# Patient Record
Sex: FEMALE | Race: OTHER
[De-identification: names, ages, dates, MRNs, and addresses within clinical notes are randomized per-mention and may not be internally consistent; named-entity substitution may affect disease eponyms.]

---

## 2019-03-20 ENCOUNTER — HOSPITAL ENCOUNTER (INPATIENT)
Dept: HOSPITAL 54 - ER | Age: 79
LOS: 3 days | Discharge: HOME | DRG: 871 | End: 2019-03-23
Attending: INTERNAL MEDICINE | Admitting: NURSE PRACTITIONER
Payer: MEDICARE

## 2019-03-20 VITALS — WEIGHT: 188 LBS | HEIGHT: 60 IN | BODY MASS INDEX: 36.91 KG/M2

## 2019-03-20 DIAGNOSIS — I50.33: ICD-10-CM

## 2019-03-20 DIAGNOSIS — I13.0: ICD-10-CM

## 2019-03-20 DIAGNOSIS — J45.909: ICD-10-CM

## 2019-03-20 DIAGNOSIS — J81.1: ICD-10-CM

## 2019-03-20 DIAGNOSIS — A41.9: Primary | ICD-10-CM

## 2019-03-20 DIAGNOSIS — E66.9: ICD-10-CM

## 2019-03-20 DIAGNOSIS — N17.9: ICD-10-CM

## 2019-03-20 DIAGNOSIS — J18.9: ICD-10-CM

## 2019-03-20 DIAGNOSIS — J96.01: ICD-10-CM

## 2019-03-20 DIAGNOSIS — N18.9: ICD-10-CM

## 2019-03-20 DIAGNOSIS — I16.9: ICD-10-CM

## 2019-03-20 DIAGNOSIS — N39.0: ICD-10-CM

## 2019-03-20 LAB
ALBUMIN SERPL BCP-MCNC: 3.9 G/DL (ref 3.4–5)
ALP SERPL-CCNC: 109 U/L (ref 46–116)
ALT SERPL W P-5'-P-CCNC: 35 U/L (ref 12–78)
AST SERPL W P-5'-P-CCNC: 40 U/L (ref 15–37)
BASOPHILS # BLD AUTO: 0.1 /CMM (ref 0–0.2)
BASOPHILS NFR BLD AUTO: 0.7 % (ref 0–2)
BILIRUB DIRECT SERPL-MCNC: 0.1 MG/DL (ref 0–0.2)
BILIRUB SERPL-MCNC: 0.5 MG/DL (ref 0.2–1)
BUN SERPL-MCNC: 32 MG/DL (ref 7–18)
CALCIUM SERPL-MCNC: 8.6 MG/DL (ref 8.5–10.1)
CHLORIDE SERPL-SCNC: 104 MMOL/L (ref 98–107)
CO2 SERPL-SCNC: 26 MMOL/L (ref 21–32)
CREAT SERPL-MCNC: 1.6 MG/DL (ref 0.6–1.3)
EOSINOPHIL NFR BLD AUTO: 1 % (ref 0–6)
GLUCOSE SERPL-MCNC: 174 MG/DL (ref 74–106)
HCT VFR BLD AUTO: 37 % (ref 33–45)
HGB BLD-MCNC: 11.8 G/DL (ref 11.5–14.8)
LYMPHOCYTES NFR BLD AUTO: 0.4 /CMM (ref 0.8–4.8)
LYMPHOCYTES NFR BLD AUTO: 2.6 % (ref 20–44)
MCHC RBC AUTO-ENTMCNC: 32 G/DL (ref 31–36)
MCV RBC AUTO: 83 FL (ref 82–100)
MONOCYTES NFR BLD AUTO: 0.2 /CMM (ref 0.1–1.3)
MONOCYTES NFR BLD AUTO: 1.2 % (ref 2–12)
NEUTROPHILS # BLD AUTO: 12.8 /CMM (ref 1.8–8.9)
NEUTROPHILS NFR BLD AUTO: 94.5 % (ref 43–81)
NT-PROBNP SERPL-MCNC: 1032 PG/ML (ref 0–125)
PLATELET # BLD AUTO: 162 /CMM (ref 150–450)
POTASSIUM SERPL-SCNC: 4.5 MMOL/L (ref 3.5–5.1)
PROT SERPL-MCNC: 7.1 G/DL (ref 6.4–8.2)
RBC # BLD AUTO: 4.49 MIL/UL (ref 4–5.2)
SODIUM SERPL-SCNC: 140 MMOL/L (ref 136–145)
WBC NRBC COR # BLD AUTO: 13.6 K/UL (ref 4.3–11)

## 2019-03-20 PROCEDURE — G0378 HOSPITAL OBSERVATION PER HR: HCPCS

## 2019-03-20 NOTE — NUR
PT BIBRA COMPLAINING OF SOB. PER EMS, PT SATURATING 89% IN ROOM AIR, WHEN PUT 
ON NON-REBREATHER PT SATING 99%. DYSPNEA AND TACHYPNEA NOTED. PT PUT ON THE 
CARDIAC MONITOR AND PULSE OX. PT SATURATING 97% ON ROOM AIR. 2LPM O2 APPLIED 
VIA N/C.

## 2019-03-21 VITALS — SYSTOLIC BLOOD PRESSURE: 117 MMHG | DIASTOLIC BLOOD PRESSURE: 49 MMHG

## 2019-03-21 VITALS — DIASTOLIC BLOOD PRESSURE: 54 MMHG | SYSTOLIC BLOOD PRESSURE: 124 MMHG

## 2019-03-21 VITALS — SYSTOLIC BLOOD PRESSURE: 134 MMHG | DIASTOLIC BLOOD PRESSURE: 53 MMHG

## 2019-03-21 VITALS — DIASTOLIC BLOOD PRESSURE: 58 MMHG | SYSTOLIC BLOOD PRESSURE: 126 MMHG

## 2019-03-21 VITALS — DIASTOLIC BLOOD PRESSURE: 64 MMHG | SYSTOLIC BLOOD PRESSURE: 103 MMHG

## 2019-03-21 VITALS — DIASTOLIC BLOOD PRESSURE: 54 MMHG | SYSTOLIC BLOOD PRESSURE: 105 MMHG

## 2019-03-21 VITALS — DIASTOLIC BLOOD PRESSURE: 42 MMHG | SYSTOLIC BLOOD PRESSURE: 96 MMHG

## 2019-03-21 LAB
APPEARANCE UR: CLEAR
BILIRUB UR QL STRIP: NEGATIVE
COLOR UR: (no result)
GLUCOSE UR STRIP-MCNC: NEGATIVE MG/DL
HGB UR QL STRIP: NEGATIVE ERY/UL
KETONES UR STRIP-MCNC: NEGATIVE MG/DL
LEUKOCYTE ESTERASE UR QL STRIP: (no result)
NITRITE UR QL STRIP: NEGATIVE
PH UR STRIP: 5 [PH] (ref 5–8)
PROT UR QL STRIP: NEGATIVE MG/DL
RBC #/AREA URNS HPF: (no result) /HPF (ref 0–2)
UROBILINOGEN UR STRIP-MCNC: 0.2 EU/DL
WBC #/AREA URNS HPF: (no result) /HPF (ref 0–3)

## 2019-03-21 RX ADMIN — FUROSEMIDE SCH MG: 10 INJECTION INTRAVENOUS at 09:21

## 2019-03-21 RX ADMIN — FUROSEMIDE SCH MG: 10 INJECTION INTRAVENOUS at 17:16

## 2019-03-21 RX ADMIN — ASPIRIN 81 MG SCH MG: 81 TABLET ORAL at 09:21

## 2019-03-21 RX ADMIN — ATORVASTATIN CALCIUM SCH MG: 10 TABLET, FILM COATED ORAL at 22:11

## 2019-03-21 RX ADMIN — ENOXAPARIN SODIUM SCH MG: 30 INJECTION SUBCUTANEOUS at 02:58

## 2019-03-21 RX ADMIN — Medication SCH EACH: at 06:04

## 2019-03-21 RX ADMIN — DEXTROSE MONOHYDRATE SCH MLS/HR: 50 INJECTION, SOLUTION INTRAVENOUS at 02:56

## 2019-03-21 RX ADMIN — ALBUTEROL SULFATE SCH MG: 2.5 SOLUTION RESPIRATORY (INHALATION) at 02:22

## 2019-03-21 RX ADMIN — Medication SCH EACH: at 22:11

## 2019-03-21 RX ADMIN — Medication SCH EACH: at 17:16

## 2019-03-21 RX ADMIN — ALBUTEROL SULFATE SCH MG: 2.5 SOLUTION RESPIRATORY (INHALATION) at 14:43

## 2019-03-21 RX ADMIN — ALBUTEROL SULFATE SCH MG: 2.5 SOLUTION RESPIRATORY (INHALATION) at 08:26

## 2019-03-21 RX ADMIN — ALBUTEROL SULFATE SCH MG: 2.5 SOLUTION RESPIRATORY (INHALATION) at 20:18

## 2019-03-21 RX ADMIN — AZITHROMYCIN DIHYDRATE SCH MG: 250 TABLET, FILM COATED ORAL at 03:33

## 2019-03-21 RX ADMIN — ALBUTEROL SULFATE SCH MG: 2.5 SOLUTION RESPIRATORY (INHALATION) at 11:20

## 2019-03-21 RX ADMIN — ENOXAPARIN SODIUM SCH MG: 30 INJECTION SUBCUTANEOUS at 22:13

## 2019-03-21 RX ADMIN — Medication SCH EACH: at 12:18

## 2019-03-21 NOTE — NUR
MS RN

RECEIVED ON BED, AWAKE,ALERT, ORIENTED X3, Estonian SPEAKING, NOT IN ANY FORM OF DISTRESS, 
RESPIRATIONS EVEN AND UNLABORED, NO SOB NOTED. LUNGS ARE RONCHI BILATERALLY, ABDOMEN 
SOFT,POSITIVE BOWEL SOUNDS. DENIES PAIN AT THIS TIME,ALL NEEDS ATTENDED.

## 2019-03-21 NOTE — NUR
TELE/RN OPENING NOTES

NEW ADMITTED PATIENT ARRIVED FROM ER ON A GURNEY ACCOMPANIED BY DAUGHTER, PATIENT IS A 79 
Y.O Hungarian SPEAKING ABLE TO AMBULATE WITH ASSISTANCE, MORBID OBESE, REQUIRING NASAL CANULA 
OF 3LITER, WHEEZING, MD MAY IS THE ADMITTING MD, SKIN INTACT, IV ON LEFT AC GAUGE 20 
PATENT. PER DAUGHTER HOME MEDICATION TO F/U WITH PHARM[facility]Y [facility] [phone] AND 
PCP IS DR ALPHONSE LADD. REFUSES FLU VACCINE AND PNA VACCINE. DENIES PAIN. DIET ORDER 
PER MD REGULAR. WILL MONITOR. MD WITH ORDER, IV ANTIBIOTIC THERAPY AND LOVENOX 
PRESCRIBED.ROOM ORIENTATION PROVIDED, BELONGINGS CHECKED.CALL LIGHTS WITHIN REACH, BED 
LOCKED.

## 2019-03-21 NOTE — NUR
MS RN NOTES



RECEIVED PT IN BED AND AWAKE WITH FAMILY AT BEDSIDE.  PT A/O X3 AND Bahamian SPEAKING.  
RESPIRATIONS EVEN AND UNLABORED WITH NO S/S OF ACUTE DISTRESS OR SOB NOTED.  PT DENIES PAIN 
AT THIS TIME.  CALL LIGHT WITHIN REACH.  WILL CONTINUE TO MONITOR.

## 2019-03-21 NOTE — NUR
324-2 TELE/RN NOTES

PATIENT RESTING COMFORTABLY , BREATHING TREATMENT PROVIDED BY RT, ON OXYGEN VIA NC, 
ADMINISTERED IV ANTIBIOTIV BY MOUTH AND IV, TOLERATED W/ NO S/S OF COMPLICATION, KEPT 
COMFORTABLE. WILL ENDORSE TO AM RN FOR GARY.

## 2019-03-22 VITALS — DIASTOLIC BLOOD PRESSURE: 53 MMHG | SYSTOLIC BLOOD PRESSURE: 113 MMHG

## 2019-03-22 VITALS — SYSTOLIC BLOOD PRESSURE: 134 MMHG | DIASTOLIC BLOOD PRESSURE: 58 MMHG

## 2019-03-22 VITALS — DIASTOLIC BLOOD PRESSURE: 77 MMHG | SYSTOLIC BLOOD PRESSURE: 109 MMHG

## 2019-03-22 VITALS — DIASTOLIC BLOOD PRESSURE: 49 MMHG | SYSTOLIC BLOOD PRESSURE: 104 MMHG

## 2019-03-22 VITALS — DIASTOLIC BLOOD PRESSURE: 51 MMHG | SYSTOLIC BLOOD PRESSURE: 122 MMHG

## 2019-03-22 LAB
BASOPHILS # BLD AUTO: 0 /CMM (ref 0–0.2)
BASOPHILS # BLD AUTO: 0 /CMM (ref 0–0.2)
BASOPHILS NFR BLD AUTO: 0.3 % (ref 0–2)
BASOPHILS NFR BLD AUTO: 0.4 % (ref 0–2)
BUN SERPL-MCNC: 35 MG/DL (ref 7–18)
CALCIUM SERPL-MCNC: 8.2 MG/DL (ref 8.5–10.1)
CHLORIDE SERPL-SCNC: 102 MMOL/L (ref 98–107)
CHOLEST SERPL-MCNC: 132 MG/DL (ref ?–200)
CO2 SERPL-SCNC: 30 MMOL/L (ref 21–32)
CREAT SERPL-MCNC: 1.9 MG/DL (ref 0.6–1.3)
D DIMER PPP FEU-MCNC: 0.23 MG/L(FEU (ref 0.17–0.5)
EOSINOPHIL NFR BLD AUTO: 0.1 % (ref 0–6)
EOSINOPHIL NFR BLD AUTO: 0.3 % (ref 0–6)
GLUCOSE SERPL-MCNC: 164 MG/DL (ref 74–106)
HCT VFR BLD AUTO: 33 % (ref 33–45)
HCT VFR BLD AUTO: 35 % (ref 33–45)
HDLC SERPL-MCNC: 38 MG/DL (ref 40–60)
HGB BLD-MCNC: 10.9 G/DL (ref 11.5–14.8)
HGB BLD-MCNC: 11.4 G/DL (ref 11.5–14.8)
LDLC SERPL DIRECT ASSAY-MCNC: 78 MG/DL (ref 0–99)
LYMPHOCYTES NFR BLD AUTO: 0.6 /CMM (ref 0.8–4.8)
LYMPHOCYTES NFR BLD AUTO: 1 /CMM (ref 0.8–4.8)
LYMPHOCYTES NFR BLD AUTO: 12.3 % (ref 20–44)
LYMPHOCYTES NFR BLD AUTO: 7.1 % (ref 20–44)
MAGNESIUM SERPL-MCNC: 1.9 MG/DL (ref 1.8–2.4)
MCHC RBC AUTO-ENTMCNC: 32 G/DL (ref 31–36)
MCHC RBC AUTO-ENTMCNC: 33 G/DL (ref 31–36)
MCV RBC AUTO: 82 FL (ref 82–100)
MCV RBC AUTO: 83 FL (ref 82–100)
MONOCYTES NFR BLD AUTO: 0.7 /CMM (ref 0.1–1.3)
MONOCYTES NFR BLD AUTO: 0.7 /CMM (ref 0.1–1.3)
MONOCYTES NFR BLD AUTO: 8 % (ref 2–12)
MONOCYTES NFR BLD AUTO: 8.4 % (ref 2–12)
NEUTROPHILS # BLD AUTO: 6.3 /CMM (ref 1.8–8.9)
NEUTROPHILS # BLD AUTO: 7.5 /CMM (ref 1.8–8.9)
NEUTROPHILS NFR BLD AUTO: 78.7 % (ref 43–81)
NEUTROPHILS NFR BLD AUTO: 84.4 % (ref 43–81)
PHOSPHATE SERPL-MCNC: 3.9 MG/DL (ref 2.5–4.9)
PLATELET # BLD AUTO: 118 /CMM (ref 150–450)
PLATELET # BLD AUTO: 119 /CMM (ref 150–450)
PLATELET # BLD AUTO: 119 /CMM (ref 150–450)
POTASSIUM SERPL-SCNC: 4 MMOL/L (ref 3.5–5.1)
RBC # BLD AUTO: 4.09 MIL/UL (ref 4–5.2)
RBC # BLD AUTO: 4.25 MIL/UL (ref 4–5.2)
SODIUM SERPL-SCNC: 140 MMOL/L (ref 136–145)
TRIGL SERPL-MCNC: 117 MG/DL (ref 30–150)
WBC NRBC COR # BLD AUTO: 8 K/UL (ref 4.3–11)
WBC NRBC COR # BLD AUTO: 8.9 K/UL (ref 4.3–11)

## 2019-03-22 RX ADMIN — Medication SCH EACH: at 18:27

## 2019-03-22 RX ADMIN — AZITHROMYCIN DIHYDRATE SCH MG: 250 TABLET, FILM COATED ORAL at 00:30

## 2019-03-22 RX ADMIN — ALBUTEROL SULFATE SCH MG: 2.5 SOLUTION RESPIRATORY (INHALATION) at 08:29

## 2019-03-22 RX ADMIN — ATORVASTATIN CALCIUM SCH MG: 10 TABLET, FILM COATED ORAL at 22:29

## 2019-03-22 RX ADMIN — Medication SCH EACH: at 06:43

## 2019-03-22 RX ADMIN — Medication SCH EACH: at 22:28

## 2019-03-22 RX ADMIN — DEXTROSE MONOHYDRATE SCH MLS/HR: 50 INJECTION, SOLUTION INTRAVENOUS at 00:30

## 2019-03-22 RX ADMIN — FUROSEMIDE SCH MG: 10 INJECTION INTRAVENOUS at 09:12

## 2019-03-22 RX ADMIN — ALBUTEROL SULFATE SCH MG: 2.5 SOLUTION RESPIRATORY (INHALATION) at 19:30

## 2019-03-22 RX ADMIN — ALBUTEROL SULFATE SCH MG: 2.5 SOLUTION RESPIRATORY (INHALATION) at 15:43

## 2019-03-22 RX ADMIN — ASPIRIN 81 MG SCH MG: 81 TABLET ORAL at 08:37

## 2019-03-22 RX ADMIN — Medication SCH EACH: at 11:52

## 2019-03-22 RX ADMIN — CELECOXIB SCH MG: 100 CAPSULE ORAL at 18:31

## 2019-03-22 RX ADMIN — ALBUTEROL SULFATE SCH MG: 2.5 SOLUTION RESPIRATORY (INHALATION) at 11:27

## 2019-03-22 NOTE — NUR
PATIENT WITH BODY TEMPERATURE .8. COOLING MEASURES APPLIED, PRN TYLENOL ADMINISTERED. 
GISSELL CAMEJO NOTIFIED. NO NEW ORDERS AT THIS TIME. WILL MONITOR FOR CHANGES.

-------------------------------------------------------------------------------

Addendum: 03/22/19 at 1010 by RAGINI CHASE RN

-------------------------------------------------------------------------------

AT 0900.

## 2019-03-22 NOTE — NUR
MS RN NOTES



PT IN BED AND SLEEPING BUT EASILY AWOKEN VERBALLY OR BY TOUCH, WITH FAMILY AT BEDSIDE.  PT 
A/O X3 AND Panamanian SPEAKING.  RESPIRATIONS EVEN AND UNLABORED WITH NO S/S OF ACUTE DISTRESS 
OR SOB THROUGHOUT SHIFT.  PT DENIES PAIN AT THIS TIME.  LEFT AC #20G PATENT AND INTACT AND 
SL.  CALL LIGHT WITHIN REACH.  WILL ENDORSE TO ONCOMING NURSE FOR CONTINUATION OF CARE.

## 2019-03-22 NOTE — NUR
MS RN NOTES



RECEIVED PT IN BED AWAKE WITH FAMILY AT BEDSIDE.  PT A/O X3 AND [place]cair[place] Islander SPEAKING.  
RESPIRATIONS EVEN AND UNLABORED WITH NO S/S OF ACUTE DISTRESS OR SOB.  PT DENIES PAIN AT 
THIS TIME.  PT ON RA AND TOLERATING WELL.  LEFT AC #20G PATENT AND INTACT AND SL.  CALL 
LIGHT WITHIN REACH.  WILL CONTINUE TO MONITOR.

## 2019-03-22 NOTE — NUR
MS RN CLOSING NOTE



PATIENT SITTING IN A CHAIR. ALERT ORIENTED X4, ON ROOM AIR, TOLERATING WELL. IN NO APPARENT 
DISTRESS OR DISCOMFORT AT THIS TIME. RESPIRATIONS EVEN AND UNLABORED. DENIES PAIN AND SOB. 
PATIENT IS ABLE TO COMMUNICATE NEEDS. REPORTS SOB ONLY WITH EXERTION, ABLE TO AMBULATE WITH 
ASSIST. LEFT AC 20G SL, PATENT AND INTACT. PATIENT KEPT CLEAN AND COMFORTABLE. ALL NEEDS 
ATTENDED, SAFETY MEASURES IN PLACE, BED IN LOW LOCKED POSITION, SIDE RAILS UP X2, CALL LIGHT 
WITHIN EASY REACH, FAMILY AT BEDSIDE, WILL ENDORSE TO PM NURSE FOR GARY.

## 2019-03-22 NOTE — NUR
MS RN OPENING NOTE



RECEIVED PATIENT IN BED. ALERT ORIENTED X4, ON 3L O2 VIA NC, TOLERATING WELL. IN NO APPARENT 
DISTRESS OR DISCOMFORT AT THIS TIME. RESPIRATIONS EVEN AND UNLABORED. DENIES PAIN AND SOB. 
PATIENT IS ABLE TO COMMUNICATE NEEDS. ABLE TO AMBULATE WITH ASSIST. LEFT AC 20G SL, PATENT 
AND INTACT. PATIENT KEPT CLEAN AND COMFORTABLE. ALL NEEDS ATTENDED, SAFETY MEASURES IN 
PLACE, BED IN LOW LOCKED POSITION, SIDE RAILS UP X2, CALL LIGHT WITHIN EASY REACH, FAMILY AT 
BEDSIDE, WILL CONTINUE TO MONITOR.

## 2019-03-23 VITALS — SYSTOLIC BLOOD PRESSURE: 118 MMHG | DIASTOLIC BLOOD PRESSURE: 56 MMHG

## 2019-03-23 LAB
BASOPHILS # BLD AUTO: 0 /CMM (ref 0–0.2)
BASOPHILS NFR BLD AUTO: 0.4 % (ref 0–2)
BUN SERPL-MCNC: 41 MG/DL (ref 7–18)
CALCIUM SERPL-MCNC: 8.2 MG/DL (ref 8.5–10.1)
CHLORIDE SERPL-SCNC: 101 MMOL/L (ref 98–107)
CO2 SERPL-SCNC: 28 MMOL/L (ref 21–32)
CREAT SERPL-MCNC: 1.7 MG/DL (ref 0.6–1.3)
EOSINOPHIL NFR BLD AUTO: 2.2 % (ref 0–6)
GLUCOSE SERPL-MCNC: 115 MG/DL (ref 74–106)
HCT VFR BLD AUTO: 33 % (ref 33–45)
HGB BLD-MCNC: 10.8 G/DL (ref 11.5–14.8)
LYMPHOCYTES NFR BLD AUTO: 0.4 /CMM (ref 0.8–4.8)
LYMPHOCYTES NFR BLD AUTO: 5.9 % (ref 20–44)
MCHC RBC AUTO-ENTMCNC: 33 G/DL (ref 31–36)
MCV RBC AUTO: 82 FL (ref 82–100)
MONOCYTES NFR BLD AUTO: 0.6 /CMM (ref 0.1–1.3)
MONOCYTES NFR BLD AUTO: 8.5 % (ref 2–12)
NEUTROPHILS # BLD AUTO: 5.7 /CMM (ref 1.8–8.9)
NEUTROPHILS NFR BLD AUTO: 83 % (ref 43–81)
PLATELET # BLD AUTO: 107 /CMM (ref 150–450)
POTASSIUM SERPL-SCNC: 4.7 MMOL/L (ref 3.5–5.1)
RBC # BLD AUTO: 4.03 MIL/UL (ref 4–5.2)
SODIUM SERPL-SCNC: 139 MMOL/L (ref 136–145)
WBC NRBC COR # BLD AUTO: 6.8 K/UL (ref 4.3–11)

## 2019-03-23 RX ADMIN — Medication SCH EACH: at 06:46

## 2019-03-23 RX ADMIN — ALBUTEROL SULFATE SCH MG: 2.5 SOLUTION RESPIRATORY (INHALATION) at 08:14

## 2019-03-23 RX ADMIN — ASPIRIN 81 MG SCH MG: 81 TABLET ORAL at 08:40

## 2019-03-23 RX ADMIN — DEXTROSE MONOHYDRATE SCH MLS/HR: 50 INJECTION, SOLUTION INTRAVENOUS at 00:45

## 2019-03-23 RX ADMIN — ALBUTEROL SULFATE SCH MG: 2.5 SOLUTION RESPIRATORY (INHALATION) at 11:16

## 2019-03-23 RX ADMIN — CELECOXIB SCH MG: 100 CAPSULE ORAL at 08:41

## 2019-03-23 NOTE — NUR
PT HAIR BRUSH FOUND IN BATHROOM, PLACED WITH STICKER IN 'LEFT PT BELONGINGS' DRAW IN NURSES 
STATION.

## 2019-03-23 NOTE — NUR
MSRN. PT PREPARED FOR D/C AS PER MD. PT TOLERATING ROOM AIR WITHOUT DISTRESS AND DENIES SOB. 
PT DENIES PAIN. PT IVC REMOVED AND NAD AT SITE. PT WITH DTR FOR TRANSLATION BRIEFED ON SO 
D/C PACKET, FOLLOW UPS TO SCHEDULE, WHEN AND HOW TO FILL RX, HOME AND NEW MEDICATIONS AND 
WHEN TO SEEK FURTHER MEDICAL ATTENTION. PT DTR VERBALIZING UNDERSTANDING, RESOURCES AND 
INTENT TO FOLLOW POC. PT WITH ALL BELONGINGS AND DOCUMENT SIGNED. PT WITH DTR AND G.DTR FOR 
TRANSPORT, RN ESCORT TO CAR VIA WHEELCHAIR. PT AND DTR LEFT WITHOUT CONCERN OR COMPLAINT AND 
GRATEFUL FOR CARE.

## 2019-03-23 NOTE — NUR
MS RN NOTES



PT IN BED SLEEPING BUT EASILY AWOKEN VERBALLY OR BY TOUCH,  WITH FAMILY AT BEDSIDE.  PT A/O 
X3 AND Malagasy SPEAKING.  RESPIRATIONS EVEN AND UNLABORED WITH NO S/S OF ACUTE DISTRESS OR 
SOB THROUGHOUT SHIFT.  PT ON RA SAT @94%.  PT DENIES PAIN AT THIS TIME.  LEFT AC #20G PATENT 
AND INTACT AND SL.  CALL LIGHT WITHIN REACH.  WILL ENDORSE TO ONCOMING NURSE FOR 
CONTINUATION OF CARE.

## 2019-03-23 NOTE — NUR
MSRN. PT RECEIVED OOB IN CHAIR AND WITH DTR PRESENT FOR TRANSLATION. PT TOLERATING ROOM AIR 
WITHOUT DISTRESS AND DENIES SOB. PT REFUSING NC ASSISTANCE. PT DENIES PAIN OR DISCOMFORT. PT 
WITH IVC AT L AC INTACT AND SALINE FLUSH PATENT, LEAKING NOTED. PT AND PT DTR REQUESTING TO 
LEAVE. PT BED IN LOWEST LOCKED POSITION WITH HANDRAILSX2 AND CALL BELL WITHIN REACH. PT 
BRIEFED ON POC AND ARE WITHOUT CONCERN OR [id number].